# Patient Record
Sex: FEMALE | Race: WHITE
[De-identification: names, ages, dates, MRNs, and addresses within clinical notes are randomized per-mention and may not be internally consistent; named-entity substitution may affect disease eponyms.]

---

## 2019-07-11 ENCOUNTER — HOSPITAL ENCOUNTER (EMERGENCY)
Dept: HOSPITAL 91 - FTE | Age: 24
Discharge: HOME | End: 2019-07-11
Payer: COMMERCIAL

## 2019-07-11 ENCOUNTER — HOSPITAL ENCOUNTER (EMERGENCY)
Dept: HOSPITAL 10 - FTE | Age: 24
Discharge: HOME | End: 2019-07-11
Payer: COMMERCIAL

## 2019-07-11 VITALS — HEART RATE: 92 BPM | RESPIRATION RATE: 18 BRPM | SYSTOLIC BLOOD PRESSURE: 117 MMHG | DIASTOLIC BLOOD PRESSURE: 55 MMHG

## 2019-07-11 VITALS
WEIGHT: 118.17 LBS | HEIGHT: 64 IN | WEIGHT: 118.17 LBS | HEIGHT: 64 IN | BODY MASS INDEX: 20.17 KG/M2 | BODY MASS INDEX: 20.17 KG/M2

## 2019-07-11 DIAGNOSIS — H81.13: Primary | ICD-10-CM

## 2019-07-11 LAB — URINE PH (DIP) POC: 6.5 (ref 5–8.5)

## 2019-07-11 PROCEDURE — 81003 URINALYSIS AUTO W/O SCOPE: CPT

## 2019-07-11 PROCEDURE — 99283 EMERGENCY DEPT VISIT LOW MDM: CPT

## 2019-07-11 PROCEDURE — 81025 URINE PREGNANCY TEST: CPT

## 2019-07-11 RX ADMIN — MECLIZINE 1 MG: 12.5 TABLET ORAL at 17:38

## 2019-07-11 NOTE — ERD
ER Documentation


Chief Complaint


Chief Complaint





DIARRHEA FOR 3 DAYS, H/A AND FATIQUE





HPI


This is a 23-year-old female patient presents emergency room with complaint of 


loose stool and vertigo x3 days.  Vision denies vomiting, no abdominal pain, no 


fever, no dysuria.  Triage note states patient has headache however patient 


denies headache at this time.  Denies change in medications, change in diet, 


increased stress.  States diarrhea is worse after eating.  Denies melena or 


hematochezia.  States she feels like she has motion sickness.  Patient is alert,


appropriate, cooperative, NAD at time of evaluation.





ROS


All systems reviewed and are negative except as per history of present illness.





Medications


Home Meds


Active Scripts


Ibuprofen* (Motrin*) 600 Mg Tab, 600 MG PO Q6 for 10 Days, #30 TAB


   Prov:AXEL AMARO NP         7/11/19


Meclizine Hcl* (Antivert*) 12.5 Mg Tab, 25 MG PO Q6H PRN for DIZZINESS for 3 


Days, #20 TAB


   Prov:AXEL AMARO NP         7/11/19


Ondansetron (Ondansetron Odt) 4 Mg Tab.rapdis, 4 MG PO Q6H PRN for NAUSEA AND/OR


VOMITING for 3 Days, #6 TAB


   Prov:AXEL AMARO NP         7/11/19


Loperamide Hcl* (Imodium*) 2 Mg Capsule, 2 MG PO .AFTER EA LOOSE BM PRN for 


DIARRHEA, #10 TAB


   Prov:AXEL AMARO NP         7/11/19





Allergies


Allergies:  


Coded Allergies:  


     No Known Allergy (Unverified , 7/11/19)





PMhx/Soc


Medical and Surgical Hx:  pt denies Medical Hx, pt denies Surgical Hx


Hx Alcohol Use:  Yes (occasional)


Hx Substance Use:  No


Hx Tobacco Use:  No


Smoking Status:  Never smoker





FmHx


Family History:  No diabetes, No coronary disease, No other





Physical Exam


Vitals





Vital Signs


  Date      Temp  Pulse  Resp  B/P (MAP)   Pulse Ox  O2          O2 Flow    FiO2


Time                                                 Delivery    Rate


   7/11/19  97.6     92    18      117/55        97


     16:51                           (75)





Physical Exam


Const:   No acute distress


Head:   Atraumatic 


Eyes:    Normal Conjunctiva, PERRL, EOMI, +lateral nystagmus


ENT:    Normal External Ears, TM clear BL, nose doubt congestion or drainage.  


Pharynx pink, no lesions, no exudate, Pharynx pink, moist, no lesions, no 


exudate, no petechiae. 


Neck:               Full range of motion. No meningismus.  No lymphadenopathy, 


no thyromegaly


Resp:   Clear to auscultation bilaterally


Cardio:   Regular rate and rhythm, no murmurs


Abd:    Soft, non tender, non distended. Normal bowel sounds, hepatospl


enomegaly, no bruising


Skin:   No petechiae or rashes


Back:   No midline or flank tenderness, CVT


Ext:    No cyanosis, or edema


Neur:   Awake and alert, CNII-XII intact, clear speech, steady gait, sensation 


intact bilaterally, strength equal 5/5


Psych:    Flat mood and Affect


Results 24 hrs





Laboratory Tests


               Test
                                7/11/19
17:43


               Bedside Urine pH (LAB)                        6.5


               Bedside Urine Protein (LAB)          Negative


               Bedside Urine Glucose (UA)           Negative


               Bedside Urine Ketones (LAB)          Negative


               Bedside Urine Blood                  Negative


               Bedside Urine Nitrite (LAB)          Negative


               Bedside Urine Leukocyte
Esterase (L  Negative 



               POC Beta HCG, Qualitative            NEGATIVE





Current Medications


 Medications
   Dose
          Sig/Salome
       Start Time
   Status  Last


 (Trade)       Ordered        Route
 PRN     Stop Time              Admin
Dose


                              Reason                                Admin


 Meclizine      50 mg          ONCE  ONCE
    7/11/19       DC           7/11/19


HCl
                          PO
            18:00
                       17:38



(Antivert)                                   7/11/19 18:01








Procedures/MDM


PROCEDURES/MDM








DIAGNOSTIC IMAGING:


None indicated at this time








LAB INTERPRETATION:


UA negative for UTI or pregnancy





-Medications:  


Meclizine


Patient tolerated medication well with no adverse reactions. Patient reported 


improvement in vertigo symptoms.





MDM:


This is a well-appearing 23-year-old female who presents to emergency room with 


complaint of loose stool, headache, fatigue x3 days.  Patient without any other 


symptoms, no vomiting, no fever, no dysuria, no abdominal pain, no hematuria, no


melena.  Physical exam negative for neurological deficit or acute abdomen.  


Patient appears well-hydrated, stable vital signs.  Patient does appear with 


flat affect and reluctant to provide details of her symptoms.  Patient was given


meclizine for her vertigo type symptoms of which she said it felt like she was 


on a boat, she reports improvement in those symptoms.  At time of discharge 


patient not in waiting area or lobby to discuss results or education.  Patient 


did approach provider and states she had waited 20 minutes since evaluation and 


was wanting to leave as she felt she had been waiting too long.  Multiple 


attempts were made to contact patient in the lobby to provide her with p


rescriptions and discharge instructions.





At time of evaluation and at time patient was inquiring about wanting to leave 


patient was ambulatory, clear speech, steady gait, no vomiting, no diarrhea, 


well-appearing, NAD.  As patient had negative physical and diagnostic exam there


is low suspicion for sepsis, occult infection, acute abdomen, any life-


threatening conditions.








DISPOSITION and PLAN: 


RX: Ibuprofen, meclizine, zofran, loperamide


The patient has been discharge home to follow-up with community physician.





Departure


Diagnosis:  


   Primary Impression:  


   BPV (benign positional vertigo)


Condition:  Stable











AXEL AMARO NP              Jul 11, 2019 17:38